# Patient Record
Sex: MALE | Race: WHITE | NOT HISPANIC OR LATINO | URBAN - METROPOLITAN AREA
[De-identification: names, ages, dates, MRNs, and addresses within clinical notes are randomized per-mention and may not be internally consistent; named-entity substitution may affect disease eponyms.]

---

## 2019-01-10 ENCOUNTER — OFFICE VISIT (OUTPATIENT)
Dept: URGENT CARE | Facility: CLINIC | Age: 39
End: 2019-01-10
Payer: COMMERCIAL

## 2019-01-10 VITALS
HEART RATE: 73 BPM | TEMPERATURE: 98.2 F | DIASTOLIC BLOOD PRESSURE: 80 MMHG | BODY MASS INDEX: 28.89 KG/M2 | WEIGHT: 218 LBS | SYSTOLIC BLOOD PRESSURE: 110 MMHG | RESPIRATION RATE: 16 BRPM | HEIGHT: 73 IN | OXYGEN SATURATION: 96 %

## 2019-01-10 DIAGNOSIS — Z00.00 VISIT FOR PREVENTIVE HEALTH EXAMINATION: ICD-10-CM

## 2019-01-10 PROCEDURE — 99201 PR OFFICE/OUTPT VISIT,NEW,LEVL I: CPT | Performed by: EMERGENCY MEDICINE

## 2019-01-10 RX ORDER — ALPRAZOLAM 3 MG/1
3 TABLET, EXTENDED RELEASE ORAL EVERY MORNING
COMMUNITY

## 2019-01-10 ASSESSMENT — ENCOUNTER SYMPTOMS
FEVER: 0
GASTROINTESTINAL NEGATIVE: 1

## 2019-01-10 NOTE — PROGRESS NOTES
"Subjective:      Rodrick Tejada is a 38 y.o. male who presents with Other (WANTS A PRESCRIPTION FOR TRUVEDA)            Other   Pertinent negatives include no fever or rash.   Patient looking for HIV preexposure prophylaxis; requesting prescription for Truvada.  Had apparent negative HIV testing about 2 months ago; no prior STDs.  Notes typical protected heterosexual activity; none since last test.    Review of Systems   Constitutional: Negative for fever.   Gastrointestinal: Negative.    Genitourinary: Negative.    Skin: Negative for rash.     PMH:  has no past medical history on file.  MEDS:   Current Outpatient Prescriptions:   •  alprazolam (XANAX XR) 3 MG XR tablet, Take 3 mg by mouth every morning., Disp: , Rfl:   ALLERGIES:   Allergies   Allergen Reactions   • Amoxicillin    • Penicillins      SURGHX: History reviewed. No pertinent surgical history.  SOCHX:    FH: family history is not on file.       Objective:     /80 (BP Location: Right arm, Patient Position: Sitting, BP Cuff Size: Adult)   Pulse 73   Temp 36.8 °C (98.2 °F) (Temporal)   Resp 16   Ht 1.854 m (6' 1\")   Wt 98.9 kg (218 lb)   SpO2 96%   BMI 28.76 kg/m²      Physical Exam   Constitutional: He is oriented to person, place, and time. He appears well-developed and well-nourished. He is cooperative. He does not have a sickly appearance. He does not appear ill. No distress.   HENT:   Mouth/Throat: Oropharynx is clear and moist.   Eyes: Conjunctivae and lids are normal.   Neck: Phonation normal. Neck supple.   Cardiovascular: Normal rate, regular rhythm and normal heart sounds.    No murmur heard.  Pulmonary/Chest: Effort normal and breath sounds normal.   Abdominal: He exhibits no distension. There is no tenderness. There is no CVA tenderness.   Neurological: He is alert and oriented to person, place, and time.   Skin: Skin is warm and dry.   Psychiatric: He has a normal mood and affect.          Confirmed with current medical director " HIV PrEP not a service provided through urgent care.     Assessment/Plan:     1. Visit for preventive health examination  - HIV AG/AB COMBO ASSAY DIAGNOSTIC; Future  - REFERRAL TO FOLLOW-UP WITH PRIMARY CARE